# Patient Record
Sex: FEMALE | Race: BLACK OR AFRICAN AMERICAN | NOT HISPANIC OR LATINO | Employment: UNEMPLOYED | URBAN - METROPOLITAN AREA
[De-identification: names, ages, dates, MRNs, and addresses within clinical notes are randomized per-mention and may not be internally consistent; named-entity substitution may affect disease eponyms.]

---

## 2024-06-13 ENCOUNTER — HOSPITAL ENCOUNTER (EMERGENCY)
Facility: HOSPITAL | Age: 34
Discharge: HOME | End: 2024-06-13
Attending: STUDENT IN AN ORGANIZED HEALTH CARE EDUCATION/TRAINING PROGRAM
Payer: MEDICAID

## 2024-06-13 VITALS
TEMPERATURE: 99 F | DIASTOLIC BLOOD PRESSURE: 80 MMHG | BODY MASS INDEX: 40.18 KG/M2 | RESPIRATION RATE: 15 BRPM | WEIGHT: 250 LBS | HEART RATE: 80 BPM | SYSTOLIC BLOOD PRESSURE: 140 MMHG | HEIGHT: 66 IN | OXYGEN SATURATION: 100 %

## 2024-06-13 DIAGNOSIS — R07.9 CHEST PAIN: ICD-10-CM

## 2024-06-13 DIAGNOSIS — E87.6 HYPOKALEMIA: ICD-10-CM

## 2024-06-13 DIAGNOSIS — R10.30 LOWER ABDOMINAL PAIN: Primary | ICD-10-CM

## 2024-06-13 DIAGNOSIS — N83.209 CYST OF OVARY, UNSPECIFIED LATERALITY: ICD-10-CM

## 2024-06-13 LAB
ALBUMIN SERPL BCP-MCNC: 3.6 G/DL (ref 3.5–5.2)
ALP SERPL-CCNC: 52 U/L (ref 55–135)
ALT SERPL W/O P-5'-P-CCNC: 19 U/L (ref 10–44)
ANION GAP SERPL CALC-SCNC: 9 MMOL/L (ref 8–16)
AST SERPL-CCNC: 20 U/L (ref 10–40)
B-HCG UR QL: NEGATIVE
BASOPHILS # BLD AUTO: 0.04 K/UL (ref 0–0.2)
BASOPHILS NFR BLD: 0.5 % (ref 0–1.9)
BILIRUB SERPL-MCNC: 0.2 MG/DL (ref 0.1–1)
BILIRUB UR QL STRIP: NEGATIVE
BNP SERPL-MCNC: 11 PG/ML (ref 0–99)
BUN SERPL-MCNC: 10 MG/DL (ref 6–20)
CALCIUM SERPL-MCNC: 9.3 MG/DL (ref 8.7–10.5)
CHLORIDE SERPL-SCNC: 110 MMOL/L (ref 95–110)
CLARITY UR: CLEAR
CO2 SERPL-SCNC: 23 MMOL/L (ref 23–29)
COLOR UR: YELLOW
CREAT SERPL-MCNC: 0.9 MG/DL (ref 0.5–1.4)
CTP QC/QA: YES
DIFFERENTIAL METHOD BLD: ABNORMAL
EOSINOPHIL # BLD AUTO: 0.1 K/UL (ref 0–0.5)
EOSINOPHIL NFR BLD: 0.6 % (ref 0–8)
ERYTHROCYTE [DISTWIDTH] IN BLOOD BY AUTOMATED COUNT: 15.7 % (ref 11.5–14.5)
EST. GFR  (NO RACE VARIABLE): >60 ML/MIN/1.73 M^2
GLUCOSE SERPL-MCNC: 84 MG/DL (ref 70–110)
GLUCOSE UR QL STRIP: NEGATIVE
HCT VFR BLD AUTO: 36.9 % (ref 37–48.5)
HGB BLD-MCNC: 11.8 G/DL (ref 12–16)
HGB UR QL STRIP: NEGATIVE
IMM GRANULOCYTES # BLD AUTO: 0.03 K/UL (ref 0–0.04)
IMM GRANULOCYTES NFR BLD AUTO: 0.3 % (ref 0–0.5)
KETONES UR QL STRIP: NEGATIVE
LEUKOCYTE ESTERASE UR QL STRIP: NEGATIVE
LYMPHOCYTES # BLD AUTO: 2.2 K/UL (ref 1–4.8)
LYMPHOCYTES NFR BLD: 25 % (ref 18–48)
MCH RBC QN AUTO: 28.9 PG (ref 27–31)
MCHC RBC AUTO-ENTMCNC: 32 G/DL (ref 32–36)
MCV RBC AUTO: 90 FL (ref 82–98)
MONOCYTES # BLD AUTO: 0.4 K/UL (ref 0.3–1)
MONOCYTES NFR BLD: 4.4 % (ref 4–15)
NEUTROPHILS # BLD AUTO: 6.1 K/UL (ref 1.8–7.7)
NEUTROPHILS NFR BLD: 69.2 % (ref 38–73)
NITRITE UR QL STRIP: NEGATIVE
NRBC BLD-RTO: 0 /100 WBC
OHS QRS DURATION: 82 MS
OHS QTC CALCULATION: 464 MS
PH UR STRIP: 6 [PH] (ref 5–8)
PLATELET # BLD AUTO: 256 K/UL (ref 150–450)
PMV BLD AUTO: 9.9 FL (ref 9.2–12.9)
POTASSIUM SERPL-SCNC: 3 MMOL/L (ref 3.5–5.1)
PROT SERPL-MCNC: 7.3 G/DL (ref 6–8.4)
PROT UR QL STRIP: NEGATIVE
RBC # BLD AUTO: 4.09 M/UL (ref 4–5.4)
SODIUM SERPL-SCNC: 142 MMOL/L (ref 136–145)
SP GR UR STRIP: 1.01 (ref 1–1.03)
TROPONIN I SERPL DL<=0.01 NG/ML-MCNC: <0.006 NG/ML (ref 0–0.03)
URN SPEC COLLECT METH UR: NORMAL
UROBILINOGEN UR STRIP-ACNC: NEGATIVE EU/DL
WBC # BLD AUTO: 8.81 K/UL (ref 3.9–12.7)

## 2024-06-13 PROCEDURE — 81025 URINE PREGNANCY TEST: CPT | Performed by: STUDENT IN AN ORGANIZED HEALTH CARE EDUCATION/TRAINING PROGRAM

## 2024-06-13 PROCEDURE — 83880 ASSAY OF NATRIURETIC PEPTIDE: CPT | Performed by: STUDENT IN AN ORGANIZED HEALTH CARE EDUCATION/TRAINING PROGRAM

## 2024-06-13 PROCEDURE — 85025 COMPLETE CBC W/AUTO DIFF WBC: CPT | Performed by: STUDENT IN AN ORGANIZED HEALTH CARE EDUCATION/TRAINING PROGRAM

## 2024-06-13 PROCEDURE — 96361 HYDRATE IV INFUSION ADD-ON: CPT

## 2024-06-13 PROCEDURE — 25500020 PHARM REV CODE 255: Performed by: STUDENT IN AN ORGANIZED HEALTH CARE EDUCATION/TRAINING PROGRAM

## 2024-06-13 PROCEDURE — 96375 TX/PRO/DX INJ NEW DRUG ADDON: CPT

## 2024-06-13 PROCEDURE — 81003 URINALYSIS AUTO W/O SCOPE: CPT | Performed by: STUDENT IN AN ORGANIZED HEALTH CARE EDUCATION/TRAINING PROGRAM

## 2024-06-13 PROCEDURE — 93010 ELECTROCARDIOGRAM REPORT: CPT | Mod: ,,, | Performed by: INTERNAL MEDICINE

## 2024-06-13 PROCEDURE — 80053 COMPREHEN METABOLIC PANEL: CPT | Performed by: STUDENT IN AN ORGANIZED HEALTH CARE EDUCATION/TRAINING PROGRAM

## 2024-06-13 PROCEDURE — 25000003 PHARM REV CODE 250: Performed by: STUDENT IN AN ORGANIZED HEALTH CARE EDUCATION/TRAINING PROGRAM

## 2024-06-13 PROCEDURE — 63600175 PHARM REV CODE 636 W HCPCS: Performed by: STUDENT IN AN ORGANIZED HEALTH CARE EDUCATION/TRAINING PROGRAM

## 2024-06-13 PROCEDURE — 96374 THER/PROPH/DIAG INJ IV PUSH: CPT

## 2024-06-13 PROCEDURE — 99285 EMERGENCY DEPT VISIT HI MDM: CPT | Mod: 25

## 2024-06-13 PROCEDURE — 84484 ASSAY OF TROPONIN QUANT: CPT | Performed by: STUDENT IN AN ORGANIZED HEALTH CARE EDUCATION/TRAINING PROGRAM

## 2024-06-13 PROCEDURE — 93005 ELECTROCARDIOGRAM TRACING: CPT

## 2024-06-13 RX ORDER — POTASSIUM CHLORIDE 20 MEQ/1
20 TABLET, EXTENDED RELEASE ORAL 2 TIMES DAILY
Qty: 28 TABLET | Refills: 0 | Status: SHIPPED | OUTPATIENT
Start: 2024-06-13 | End: 2024-06-27

## 2024-06-13 RX ORDER — ONDANSETRON HYDROCHLORIDE 2 MG/ML
4 INJECTION, SOLUTION INTRAVENOUS
Status: COMPLETED | OUTPATIENT
Start: 2024-06-13 | End: 2024-06-13

## 2024-06-13 RX ORDER — MORPHINE SULFATE 4 MG/ML
4 INJECTION, SOLUTION INTRAMUSCULAR; INTRAVENOUS
Status: COMPLETED | OUTPATIENT
Start: 2024-06-13 | End: 2024-06-13

## 2024-06-13 RX ORDER — KETOROLAC TROMETHAMINE 30 MG/ML
15 INJECTION, SOLUTION INTRAMUSCULAR; INTRAVENOUS
Status: COMPLETED | OUTPATIENT
Start: 2024-06-13 | End: 2024-06-13

## 2024-06-13 RX ADMIN — MORPHINE SULFATE 4 MG: 4 INJECTION INTRAVENOUS at 06:06

## 2024-06-13 RX ADMIN — KETOROLAC TROMETHAMINE 15 MG: 30 INJECTION, SOLUTION INTRAMUSCULAR at 03:06

## 2024-06-13 RX ADMIN — ONDANSETRON 4 MG: 2 INJECTION INTRAMUSCULAR; INTRAVENOUS at 03:06

## 2024-06-13 RX ADMIN — SODIUM CHLORIDE 1000 ML: 9 INJECTION, SOLUTION INTRAVENOUS at 03:06

## 2024-06-13 RX ADMIN — POTASSIUM BICARBONATE 50 MEQ: 977.5 TABLET, EFFERVESCENT ORAL at 06:06

## 2024-06-13 RX ADMIN — IOHEXOL 75 ML: 350 INJECTION, SOLUTION INTRAVENOUS at 04:06

## 2024-06-13 NOTE — PROVIDER PROGRESS NOTES - EMERGENCY DEPT.
Encounter Date: 6/13/2024    ED Physician Progress Notes            Patient signed out to me at the change of shift. I refer you to the prior provider's history and physical examination for comprehensive evaluation. Remainder of my involvement in this patient's case will be dictated below.     DISCLAIMER: This note was prepared with [email or web address] voice recognition transcription software. Garbled syntax, mangled pronouns, and other bizarre constructions may be attributed to that software system.      In brief:    34-year-old female signed out to follow-up on transvaginal ultrasound.  Ultrasound reviewed.  Urine pregnancy is negative.  Discussed with the patient, all questions answered.  She was happy with the plan and wanted to go home.  Will give obstetrics follow-up.  Hypokalemia repleted.

## 2024-06-13 NOTE — DISCHARGE INSTRUCTIONS

## 2024-06-13 NOTE — ED TRIAGE NOTES
Patient presents to the ED via EMS c/o bilateral lower quadrant abd pain x a few days and midsternal CP that began shortly before activating EMS. Pt describes abd pain as cramping and could possibly be pregnant. States last menstrual cycle was in April, took Plan B a few days ago and has been having abd cramping since. Denies SOB, urinary s/s, or back pain. Last BM was today, denies straining.

## 2024-06-13 NOTE — ED PROVIDER NOTES
"Encounter Date: 2024    SCRIBE #1 NOTE: I, Stephanieblanca Tripp, am scribing for, and in the presence of,  Klaus Hart MD.       History     Chief Complaint   Patient presents with    Chest Pain    Abdominal Pain     Reproducible chest pain/periumbilical pain. Patient reports also being homeless and possibly pregnant. Pt took a plan B a few days ago "to just get rid of it."     Amanda Bustillo is a 34 y.o. female, with reported PMHx of HTN, who presents to the ED with substernal CP tonight. Patient also reports lower abdominal pain and a dry cough. Reports LMP 2024, denies Hx of irregular periods. Reports negative UPT "last month". A2, reports last miscarriage 2023. States she took a Plan B a few days ago. She also reports dark-colored stool a few days ago, denies recent pepto bismol or iron supplement use. No other exacerbating or alleviating factors. Denies nausea, vomiting, diarrhea, vaginal bleeding, vaginal discharge, dysuria, hematuria or other associated symptoms.       The history is provided by the patient. No  was used.     Review of patient's allergies indicates:  No Known Allergies  No past medical history on file.  No past surgical history on file.  No family history on file.     Review of Systems   Constitutional:  Negative for chills and fever.   HENT:  Negative for facial swelling and sore throat.    Eyes:  Negative for visual disturbance.   Respiratory:  Positive for cough. Negative for shortness of breath.    Cardiovascular:  Positive for chest pain. Negative for palpitations.   Gastrointestinal:  Positive for abdominal pain. Negative for constipation, diarrhea, nausea and vomiting.        (+) dark colored stool   Genitourinary:  Positive for menstrual problem. Negative for decreased urine volume, difficulty urinating, dysuria, frequency, hematuria, vaginal bleeding and vaginal discharge.   Musculoskeletal:  Negative for back pain.   Skin:  Negative for rash. "   Neurological:  Negative for dizziness, syncope, weakness, light-headedness and headaches.   Hematological:  Does not bruise/bleed easily.   Psychiatric/Behavioral: Negative.         Physical Exam     Initial Vitals [06/13/24 0122]   BP Pulse Resp Temp SpO2   120/80 82 16 98.5 °F (36.9 °C) 99 %      MAP       --         Physical Exam    Nursing note and vitals reviewed.  Constitutional: She appears well-developed and well-nourished. She is not diaphoretic. No distress.   HENT:   Head: Normocephalic and atraumatic.   Right Ear: External ear normal.   Left Ear: External ear normal.   Nose: Nose normal.   Eyes: Conjunctivae are normal. No scleral icterus.   Neck: Neck supple. No tracheal deviation present.   Normal range of motion.  Cardiovascular:  Normal rate, regular rhythm and normal heart sounds.           Pulses:       Dorsalis pedis pulses are 2+ on the right side and 2+ on the left side.   Pulmonary/Chest: Breath sounds normal. No respiratory distress.   Abdominal: Abdomen is soft. Bowel sounds are normal. There is abdominal tenderness in the suprapubic area. There is no rebound and no guarding.   Musculoskeletal:      Cervical back: Normal range of motion and neck supple.     Neurological: She is alert and oriented to person, place, and time.   Skin: Skin is warm and dry.   Psychiatric: She has a normal mood and affect. Thought content normal.         ED Course   Procedures  Labs Reviewed   CBC W/ AUTO DIFFERENTIAL - Abnormal; Notable for the following components:       Result Value    Hemoglobin 11.8 (*)     Hematocrit 36.9 (*)     RDW 15.7 (*)     All other components within normal limits   COMPREHENSIVE METABOLIC PANEL - Abnormal; Notable for the following components:    Potassium 3.0 (*)     Alkaline Phosphatase 52 (*)     All other components within normal limits   B-TYPE NATRIURETIC PEPTIDE   TROPONIN I   URINALYSIS, REFLEX TO URINE CULTURE    Narrative:     In and Out Cath as needed it patient unable  to void  Specimen Source->Urine   POCT URINE PREGNANCY          Imaging Results               CT Abdomen Pelvis With IV Contrast NO Oral Contrast (Final result)  Result time 06/13/24 06:08:12      Final result by Db Hernández MD (06/13/24 06:08:12)                   Impression:      Indeterminate lesion of the right lobe of the liver, follow-up nonemergent MRI hepatic mass protocol is recommended.    2.9 cm complex cystic lesion at the left adnexa, ultrasound follow-up is recommended.    Prominence of the region of the lower uterine segment/cervix for which clinical and historical correlation and evaluation is recommended.    Findings referable to the L5-S1 disc level may relate to chronic degenerative change however correlation for signs or symptoms of infection to exclude the possibly of discitis is needed, if indicated MRI examination.    Additional findings as above.    This report was flagged in Epic as abnormal.      Electronically signed by: Db Hernández  Date:    06/13/2024  Time:    06:08               Narrative:    EXAMINATION:  CT ABDOMEN PELVIS WITH IV CONTRAST    CLINICAL HISTORY:  LLQ abdominal pain;RLQ abdominal pain (Age >= 14y);    TECHNIQUE:  Low dose axial images, sagittal and coronal reformations were obtained from the lung bases to the pubic symphysis following the IV administration of 75 mL of Omnipaque 350 oral contrast was not utilized.  Single phase postcontrast CT examination of the abdomen and pelvis is submitted.    COMPARISON:  None.    FINDINGS:  The visualized lung bases demonstrate mild atelectatic appearing change.  The stomach demonstrates nonspecific appearance of fluid and air within the gastric lumen.    There is no evidence for pericholecystic or peripancreatic inflammatory change, there is no abnormal pancreatic or biliary ductal dilatation, there is no evidence for acute process of the spleen or adrenal glands.    Diminished attenuation of the liver consistent with  diffuse fatty infiltrate is noted.  There is a 1.5 cm finding of increased attenuation within the right lobe of the liver image 53, it is possible this represents a focal area of fatty sparing however this may relate to an enhancing lesion, follow-up MRI examination hepatic mass protocol is recommended.    There is no evidence for ureteral calculus or obstructive uropathy or perinephric inflammatory change bilaterally.  The abdominal aorta appears normal in caliber, demonstrates appropriate opacification.  The urinary bladder is incompletely distended, mild wall thickening likely relates to incomplete distention.    There is diminished attenuation along the medial with the uterus, nonspecific appearance on CT although may relate to mild fluid along the endometrial canal.  There is prominence of the region of the lower uterine segment/cervix for which clinical and historical correlation and evaluation is recommended.  There is a 2.9 cm mildly complex hypoechoic finding of the left adnexa measuring approximately 30 Hounsfield units, this may relate to a mildly complex cyst or cystic lesion, ultrasound follow-up is recommended.  There is mild free fluid of the cul-de-sac noted.    There is a small fat-density umbilical hernia without bowel involvement.  There is no evidence for small bowel obstructive process.  The appendix is identified, it does not appear inflamed.  There is mild prominence of the colon with air and stool, there is no inflammatory or obstructive change of the colon.  There is no free intraperitoneal air.    The osseous structures demonstrate chronic change.  There are findings at the L5-S1 level, in plate changes at L5-S1 may relate to chronic change however there is mild haziness about the intervertebral disc, this may relate to chronic degenerative change however correlation for signs or symptoms of infectious etiology is needed, if clinically warranted MRI examination.                                        X-Ray Chest PA And Lateral (Final result)  Result time 06/13/24 05:13:07      Final result by Db Hernández MD (06/13/24 05:13:07)                   Impression:      There is no radiographic evidence for acute intrathoracic process.      Electronically signed by: Db Hernández  Date:    06/13/2024  Time:    05:13               Narrative:    EXAMINATION:  XR CHEST PA AND LATERAL    CLINICAL HISTORY:  Chest Pain;    TECHNIQUE:  PA and lateral views of the chest were performed.    COMPARISON:  None    FINDINGS:  Two views of the chest are submitted.  The cardiomediastinal silhouette appears appropriate.    There is no evidence for confluent infiltrate or consolidation, significant pleural effusion or pneumothorax.    The visualized osseous structures appear intact.                                       Medications   sodium chloride 0.9% bolus 1,000 mL 1,000 mL (0 mLs Intravenous Stopped 6/13/24 0438)   ondansetron injection 4 mg (4 mg Intravenous Given 6/13/24 0337)   ketorolac injection 15 mg (15 mg Intravenous Given 6/13/24 0337)   iohexoL (OMNIPAQUE 350) injection 75 mL (75 mLs Intravenous Given 6/13/24 0429)   morphine injection 4 mg (4 mg Intravenous Given 6/13/24 0638)   potassium bicarbonate disintegrating tablet 50 mEq (50 mEq Oral Given 6/13/24 0639)     Medical Decision Making  Amount and/or Complexity of Data Reviewed  Labs: ordered. Decision-making details documented in ED Course.  Radiology: ordered. Decision-making details documented in ED Course.  ECG/medicine tests: ordered and independent interpretation performed. Decision-making details documented in ED Course.    Risk  Prescription drug management.    Patient presenting to the emergency department for evaluation of lower abdominal pain as well as chest wall pain.  Patient's EKG is nonischemic.  Troponin is normal.  Reproducible on exam.  Doubt ACS.  Lower abdominal pain noted on exam.  She denies vaginal discharge, vaginal  bleeding.  She notes last menstrual period was in April.  Urine pregnancy is negative here.  Laboratory workup is reassuring.  CT of the abdomen and pelvis shows an indeterminate lesion in the right lobe of the liver.  This was discussed with the patient.  She will need further outpatient workup regarding this finding.  Prominence in the lower uterine segment noted on CT as well as complex lesion in the left adnexa.  I have ordered an ultrasound to further evaluate.  This is pending at this time.  Handoff to Dr. Acevedo at this time.         Scribe Attestation:   Scribe #1: I performed the above scribed service and the documentation accurately describes the services I performed. I attest to the accuracy of the note.        ED Course as of 06/13/24 0658   Thu Jun 13, 2024   0317 hCG Qualitative, Urine: Negative [CC]   0625 CT Abdomen Pelvis With IV Contrast NO Oral Contrast(!)  mpression:     Indeterminate lesion of the right lobe of the liver, follow-up nonemergent MRI hepatic mass protocol is recommended.     2.9 cm complex cystic lesion at the left adnexa, ultrasound follow-up is recommended.     Prominence of the region of the lower uterine segment/cervix for which clinical and historical correlation and evaluation is recommended.     Findings referable to the L5-S1 disc level may relate to chronic degenerative change however correlation for signs or symptoms of infection to exclude the possibly of discitis is needed, if indicated MRI examination.     Additional findings as above.     This report was flagged in Epic as abnormal.      [CC]   0626 Patient notes continued pain on evaluation. [CC]   0631 X-Ray Chest PA And Lateral  Impression:     There is no radiographic evidence for acute intrathoracic process.         [CC]   0632 EKG: Rate 88, regular rhythm, sinus rhythm, intervals within normal limits, no ST elevations or depressions noted.  Normal EKG.  Normal sinus rhythm.  Interpreted by me, reviewed by me.   No previous to compare. [CC]      ED Course User Index  [CC] Klaus Hart MD                         I, Klaus Hart, MDDictation #1  MRN:[id number]  CSN:[id number]  personally performed the services described in this documentation.  All medical record entries made by the scribe were at my direction and in my presence.  I have reviewed the chart and agree that the record reflects my personal performance and is accurate and complete.    Clinical Impression:  Final diagnoses:  [R07.9] Chest pain  [R10.30] Lower abdominal pain (Primary)                 Klaus Hart MD  06/13/24 0658